# Patient Record
(demographics unavailable — no encounter records)

---

## 2024-12-17 NOTE — PHYSICAL EXAM
[Alert] : alert [No Acute Distress] : no acute distress [Playful] : playful [Normocephalic] : normocephalic [Conjunctivae with no discharge] : conjunctivae with no discharge [PERRL] : PERRL [EOMI Bilateral] : EOMI bilateral [Auricles Well Formed] : auricles well formed [Clear Tympanic membranes with present light reflex and bony landmarks] : clear tympanic membranes with present light reflex and bony landmarks [No Discharge] : no discharge [Nares Patent] : nares patent [Pink Nasal Mucosa] : pink nasal mucosa [Palate Intact] : palate intact [Uvula Midline] : uvula midline [Nonerythematous Oropharynx] : nonerythematous oropharynx [No Caries] : no caries [Trachea Midline] : trachea midline [Supple, full passive range of motion] : supple, full passive range of motion [No Palpable Masses] : no palpable masses [Symmetric Chest Rise] : symmetric chest rise [Clear to Auscultation Bilaterally] : clear to auscultation bilaterally [Normoactive Precordium] : normoactive precordium [Regular Rate and Rhythm] : regular rate and rhythm [Normal S1, S2 present] : normal S1, S2 present [No Murmurs] : no murmurs [+2 Femoral Pulses] : +2 femoral pulses [Soft] : soft [NonTender] : non tender [Non Distended] : non distended [Normoactive Bowel Sounds] : normoactive bowel sounds [No Hepatomegaly] : no hepatomegaly [No Splenomegaly] : no splenomegaly [Kvng 1] : Kvng 1 [Central Urethral Opening] : central urethral opening [Testicles Descended Bilaterally] : testicles descended bilaterally [Patent] : patent [Normally Placed] : normally placed [No Abnormal Lymph Nodes Palpated] : no abnormal lymph nodes palpated [Symmetric Buttocks Creases] : symmetric buttocks creases [Symmetric Hip Rotation] : symmetric hip rotation [No Gait Asymmetry] : no gait asymmetry [No pain or deformities with palpation of bone, muscles, joints] : no pain or deformities with palpation of bone, muscles, joints [Normal Muscle Tone] : normal muscle tone [No Spinal Dimple] : no spinal dimple [Straight] : straight [NoTuft of Hair] : no tuft of hair [+2 Patella DTR] : +2 patella DTR [Cranial Nerves Grossly Intact] : cranial nerves grossly intact [No Rash or Lesions] : no rash or lesions

## 2024-12-17 NOTE — DEVELOPMENTAL MILESTONES
[Dresses and undresses without] : dresses and undresses without much help [Goes to the bathroom and has] : goes to bathroom and has bowel movement by self [Uses 4-word sentences] : does not use 4-word sentences [Uses words that are 100%] : does not use words that are 100% intelligible to strangers [Tells a story from a book] : does not tell a story from a book [Climbs stairs, alternating feet] : climbs stairs, alternating feet without support [Skips on one foot] : skips on one foot [Draws a person with head and] : draws a person with head and 3 body part [Draws a simple cross] : draws a simple cross [Grasps a pencil with thumb and] : grasps a pencil with thumb and fingers instead of fist [FreeTextEntry1] : Receives ST

## 2024-12-17 NOTE — HISTORY OF PRESENT ILLNESS
[whole ___ oz/d] : consumes [unfilled] oz of whole cow's milk per day [Fruit] : fruit [Vegetables] : vegetables [Meat] : meat [Grains] : grains [Eggs] : eggs [Dairy] : dairy [Loose] : stools are loose consistency [Normal] : Normal [In Pre-K] : In Pre-K [de-identified] : picky eating is improved [FreeTextEntry9] : Will continue ST services

## 2024-12-17 NOTE — ADDENDUM
[FreeTextEntry1] : SDOH Screening Questionnaire  SDOH (Social Determinants of Health) Questionnaire:  1. Housing: Do you worry that in the upcoming months, your family, or child, may not have a safe or stable place to live?no  2. Food security: Within the last 12 months, did the food you bought not last and you did not have money to buy more?no  3. Community: Do you need help getting public benefits like food stamps or WIC?no  4. Transportation: Does your child have chronic medical condition and therefore struggle with transportation to attend medical appointments?no  5. Healthcare Access: Do you need help getting health or dental insurance? no    Result: Negative Screen. No further intervention needed.

## 2025-05-05 NOTE — DISCUSSION/SUMMARY
[FreeTextEntry1] : Samuel is a 5 yo male with history of recurrent allergic conjunctivitis. It is shared with allergic rhinitis every season. -Start cetirizine every night before bed. - 1 spray of Flonase each nostril daily. -  Ketotifen Fumarate  ophth soln.1 drop daily in both eyes. - RTO in 1 week for revaluation.

## 2025-05-05 NOTE — DISCUSSION/SUMMARY
[FreeTextEntry1] : Samuel is a 3 yo male with history of recurrent allergic conjunctivitis. It is shared with allergic rhinitis every season. -Start cetirizine every night before bed. - 1 spray of Flonase each nostril daily. -  Ketotifen Fumarate  ophth soln.1 drop daily in both eyes. - RTO in 1 week for revaluation.

## 2025-05-05 NOTE — PHYSICAL EXAM
[Increased Tearing] : increased tearing [Allergic Shiners] : allergic shiners [NL] : warm, clear [FreeTextEntry5] : itching